# Patient Record
(demographics unavailable — no encounter records)

---

## 2025-04-21 NOTE — HISTORY OF PRESENT ILLNESS
[FreeTextEntry1] : Here for CPE Overall feeling well [de-identified] : Never a smoker. Occasional alcohol. Last GYN check-up September 2024 Walks for exercise

## 2025-04-21 NOTE — HEALTH RISK ASSESSMENT
[Excellent] : ~his/her~ current health as excellent [Very Good] : ~his/her~  mood as very good [Yes] : Yes [Never] : Never [de-identified] : occassional

## 2025-04-21 NOTE — HISTORY OF PRESENT ILLNESS
[FreeTextEntry1] : Here for CPE Overall feeling well [de-identified] : Never a smoker. Occasional alcohol. Last GYN check-up September 2024 Walks for exercise

## 2025-04-21 NOTE — HEALTH RISK ASSESSMENT
[Excellent] : ~his/her~ current health as excellent [Very Good] : ~his/her~  mood as very good [Yes] : Yes [Never] : Never [de-identified] : occassional